# Patient Record
(demographics unavailable — no encounter records)

---

## 2025-03-21 NOTE — HEALTH RISK ASSESSMENT
[Patient reported colonoscopy was normal] : Patient reported colonoscopy was normal [ColonoscopyDate] : 11/2022 [ColonoscopyComments] : per patient, 7-10 year follow up

## 2025-03-21 NOTE — DISCUSSION/SUMMARY
[Patient seen for WTC Monitoring ___] : Patient was seen for WTC monitoring [unfilled] [Please See Note in Chart and Documentation in Trial DB] : Please see note in chart and documentation in Trial DB. [FreeTextEntry3] : ID [id number].   HPI: 51 yo M, certified for thyroid cancer and anxiety, presents for annual visit. See follow up note.   PCP: Dr. Delfin Diehl, [facility] GI: Dr. 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 Per EAQ dated 10/09: worked 9/17/01-9/30/01 12-18 hours/day and 2 days in Oct 2001 8-10 hours/day Occupational Hx:  with [employer]; formerly worked for the [employer]   PMH/PSH:  - [facility] certified: thyroid cancer, anxiety - Noncertified: GERD (self-treats by slowly chewing food, s/p EGD 2015 and 2022), vasectomy  Family Hx: mother with cervical and breast cancer Allergies: see above; PCN? as a child, thinks he grew out of this Meds: see above Social Hx: smoked briefly at age 18   Review of Systems: IAMQ reviewed with patient   PE: see follow up note and Trial DB   Results: - Imaging: CXR 2021, CXR 3/2025 - Spirometry: reviewed   A/P: - CBC, CMP, lipids and UA ordered - Colonoscopy up to date - Flu shot done today - Reviewed past labs and imaging - RTC in 1 year or sooner if needed

## 2025-03-21 NOTE — HISTORY OF PRESENT ILLNESS
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 Charles at [facility], enrolled in survivorship program in [place] - Patient follows with endocrinologist, reports thyroid hormone levels checked yearly, on Synthroid

## 2025-03-21 NOTE — PAST MEDICAL HISTORY
[FreeTextEntry1] : See [facility] monitoring note for exposure history. normal...

## 2025-03-21 NOTE — DISCUSSION/SUMMARY
[Patient seen for WTC Monitoring ___] : Patient was seen for WTC monitoring [unfilled] [Please See Note in Chart and Documentation in Trial DB] : Please see note in chart and documentation in Trial DB. [FreeTextEntry3] : ID [id number].   HPI: 51 yo M, certified for thyroid cancer and anxiety, presents for annual visit. See follow up note.   PCP: Dr. Delfin Diehl, [facility] GI: Dr. 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 Per EAQ dated 10/09: worked 9/17/01-9/30/01 12-18 hours/day and 2 days in Oct 2001 8-10 hours/day Occupational Hx:  with [employer]; formerly worked for the [employer]   PMH/PSH:  - [facility] certified: thyroid cancer, anxiety - Noncertified: GERD (self-treats by slowly chewing food, s/p EGD 2015 and 2022), vasectomy  Family Hx: mother with cervical and breast cancer Allergies: see above; PCN? as a child, thinks he grew out of this Meds: see above Social Hx: smoked briefly at age 18   Review of Systems: IAMQ reviewed with patient   PE: see follow up note and Trial DB   Results: - Imaging: CXR 2021, CXR 3/2025 - Spirometry: reviewed   A/P: - CBC, CMP, lipids and UA ordered - Colonoscopy up to date - Flu shot done today - Reviewed past labs and imaging - RTC in 1 year or sooner if needed

## 2025-03-21 NOTE — HISTORY OF PRESENT ILLNESS
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 Charles at [facility], enrolled in survivorship program in [place] - Patient follows with endocrinologist, reports thyroid hormone levels checked yearly, on Synthroid